# Patient Record
Sex: FEMALE | URBAN - METROPOLITAN AREA
[De-identification: names, ages, dates, MRNs, and addresses within clinical notes are randomized per-mention and may not be internally consistent; named-entity substitution may affect disease eponyms.]

---

## 2021-07-07 ENCOUNTER — APPOINTMENT (RX ONLY)
Dept: URBAN - METROPOLITAN AREA CLINIC 100 | Facility: CLINIC | Age: 61
Setting detail: DERMATOLOGY
End: 2021-07-07

## 2021-07-07 DIAGNOSIS — L72.0 EPIDERMAL CYST: ICD-10-CM

## 2021-07-07 PROCEDURE — ? RECOMMENDATIONS

## 2021-07-07 PROCEDURE — ? COUNSELING

## 2021-07-07 PROCEDURE — 99202 OFFICE O/P NEW SF 15 MIN: CPT

## 2021-07-07 ASSESSMENT — LOCATION DETAILED DESCRIPTION DERM: LOCATION DETAILED: MONS PUBIS

## 2021-07-07 ASSESSMENT — LOCATION SIMPLE DESCRIPTION DERM: LOCATION SIMPLE: GROIN

## 2021-07-07 ASSESSMENT — LOCATION ZONE DERM: LOCATION ZONE: VULVA

## 2021-07-07 NOTE — PROCEDURE: RECOMMENDATIONS
Recommendations (Free Text): Recommend a referral to dr. Kenney for excision.  Also recommend warm compresses.
Detail Level: Detailed
Render Risk Assessment In Note?: no